# Patient Record
Sex: FEMALE | NOT HISPANIC OR LATINO | URBAN - METROPOLITAN AREA
[De-identification: names, ages, dates, MRNs, and addresses within clinical notes are randomized per-mention and may not be internally consistent; named-entity substitution may affect disease eponyms.]

---

## 2017-08-02 ENCOUNTER — IMPORTED ENCOUNTER (OUTPATIENT)
Dept: URBAN - METROPOLITAN AREA CLINIC 1 | Facility: CLINIC | Age: 47
End: 2017-08-02

## 2017-08-02 PROBLEM — H52.4: Noted: 2017-08-02

## 2017-08-02 PROBLEM — H52.03: Noted: 2017-08-02

## 2017-08-02 PROCEDURE — S0620 ROUTINE OPHTHALMOLOGICAL EXA: HCPCS

## 2017-08-02 NOTE — PATIENT DISCUSSION
1.  Hyperopia: Rx was given for corrective spectacles if indicated. 2.  PresbyopiaReturn for an appointment in 1 year 36 with Dr. Rex Leone.

## 2019-02-13 ENCOUNTER — IMPORTED ENCOUNTER (OUTPATIENT)
Dept: URBAN - METROPOLITAN AREA CLINIC 1 | Facility: CLINIC | Age: 49
End: 2019-02-13

## 2019-02-13 PROBLEM — H52.4: Noted: 2019-02-13

## 2019-02-13 PROCEDURE — S0621 ROUTINE OPHTHALMOLOGICAL EXA: HCPCS

## 2019-02-13 NOTE — PATIENT DISCUSSION
1.  Presbyopia: Rx was given for corrective spectacles if indicated. 2.  Return for an appointment in 1 year for 40. with Dr. Luis Alberto Rachel.

## 2021-03-16 ENCOUNTER — IMPORTED ENCOUNTER (OUTPATIENT)
Dept: URBAN - METROPOLITAN AREA CLINIC 1 | Facility: CLINIC | Age: 51
End: 2021-03-16

## 2021-03-16 PROBLEM — H52.03: Noted: 2021-03-16

## 2021-03-16 PROBLEM — H52.223: Noted: 2021-03-16

## 2021-03-16 PROBLEM — H52.4: Noted: 2021-03-16

## 2021-03-16 PROCEDURE — S0621 ROUTINE OPHTHALMOLOGICAL EXA: HCPCS

## 2021-03-16 NOTE — PATIENT DISCUSSION
1.  Hyperopia- Rx was given for corrective spectacles if indicated. 2.  Astigmatism OU3. Presbyopia4. RADHA OU- Recommend the frequent use of AT's BID OU routinely. Return for an appointment in 1 year 36 with Dr. Sven Márquez.

## 2022-04-03 ASSESSMENT — VISUAL ACUITY
OD_SC: 20/20
OS_SC: 20/40
OD_SC: 20/20-1
OD_SC: 20/20-1
OD_SC: J1+
OS_SC: J1+
OD_CC: J2
OS_SC: 20/40
OS_SC: 20/40
OD_CC: J1+
OS_CC: J2
OS_CC: J2

## 2022-04-03 ASSESSMENT — TONOMETRY
OD_IOP_MMHG: 16
OS_IOP_MMHG: 16
OS_IOP_MMHG: 16
OD_IOP_MMHG: 17
OS_IOP_MMHG: 15
OD_IOP_MMHG: 16

## 2022-10-25 ENCOUNTER — COMPREHENSIVE EXAM (OUTPATIENT)
Dept: URBAN - METROPOLITAN AREA CLINIC 1 | Facility: CLINIC | Age: 52
End: 2022-10-25

## 2022-10-25 DIAGNOSIS — H53.023: ICD-10-CM

## 2022-10-25 DIAGNOSIS — H52.03: ICD-10-CM

## 2022-10-25 DIAGNOSIS — H52.223: ICD-10-CM

## 2022-10-25 DIAGNOSIS — H52.4: ICD-10-CM

## 2022-10-25 PROCEDURE — 92015 DETERMINE REFRACTIVE STATE: CPT

## 2022-10-25 PROCEDURE — 92014 COMPRE OPH EXAM EST PT 1/>: CPT

## 2022-10-25 ASSESSMENT — KERATOMETRY
OD_AXISANGLE2_DEGREES: 99
OD_K2POWER_DIOPTERS: 44.25
OS_AXISANGLE2_DEGREES: 33
OS_K1POWER_DIOPTERS: 42.50
OS_AXISANGLE_DEGREES: 123
OD_K1POWER_DIOPTERS: 43.00
OD_AXISANGLE_DEGREES: 009
OS_K2POWER_DIOPTERS: 43.00

## 2022-10-25 ASSESSMENT — VISUAL ACUITY
OS_CC: 20/40
OD_CC: 20/30

## 2022-10-25 ASSESSMENT — TONOMETRY
OS_IOP_MMHG: 16
OD_IOP_MMHG: 16